# Patient Record
Sex: MALE | Race: WHITE | URBAN - NONMETROPOLITAN AREA
[De-identification: names, ages, dates, MRNs, and addresses within clinical notes are randomized per-mention and may not be internally consistent; named-entity substitution may affect disease eponyms.]

---

## 2021-06-23 LAB
CREATININE, RANDOM URINE: 44.2
CREATININE, URINE: 44.2
MICROALBUMIN/CREAT 24H UR: 7.1 MG/G{CREAT}
MICROALBUMIN/CREAT UR-RTO: 160.6

## 2021-08-14 LAB
ALBUMIN SERPL-MCNC: 4.5 G/DL
ALP BLD-CCNC: 47 U/L
ALT SERPL-CCNC: 31 U/L
ANION GAP SERPL CALCULATED.3IONS-SCNC: 11 MMOL/L
AST SERPL-CCNC: 26 U/L
BASOPHILS ABSOLUTE: NORMAL
BASOPHILS RELATIVE PERCENT: NORMAL
BILIRUB SERPL-MCNC: 0.5 MG/DL (ref 0.1–1.4)
BILIRUBIN, URINE: NEGATIVE
BLOOD, URINE: NEGATIVE
BUN BLDV-MCNC: 22 MG/DL
CALCIUM SERPL-MCNC: 8.8 MG/DL
CHLORIDE BLD-SCNC: 103 MMOL/L
CHOLESTEROL, TOTAL: 115 MG/DL
CHOLESTEROL/HDL RATIO: NORMAL
CLARITY: CLEAR
CO2: 31 MMOL/L
COLOR: YELLOW
CREAT SERPL-MCNC: 1.2 MG/DL
CREATININE, URINE: 64.6
EOSINOPHILS ABSOLUTE: NORMAL
EOSINOPHILS RELATIVE PERCENT: NORMAL
GFR CALCULATED: 77
GLUCOSE BLD-MCNC: 87 MG/DL
GLUCOSE URINE: NEGATIVE
HCT VFR BLD CALC: 47.2 % (ref 41–53)
HDLC SERPL-MCNC: 60 MG/DL (ref 35–70)
HEMOGLOBIN: 15.3 G/DL (ref 13.5–17.5)
KETONES, URINE: NEGATIVE
LDL CHOLESTEROL CALCULATED: 46 MG/DL (ref 0–160)
LEUKOCYTE ESTERASE, URINE: NEGATIVE
LYMPHOCYTES ABSOLUTE: NORMAL
LYMPHOCYTES RELATIVE PERCENT: NORMAL
MCH RBC QN AUTO: NORMAL PG
MCHC RBC AUTO-ENTMCNC: NORMAL G/DL
MCV RBC AUTO: NORMAL FL
MICROALBUMIN/CREAT 24H UR: 39.7 MG/G{CREAT}
MICROALBUMIN/CREAT UR-RTO: 61.5
MONOCYTES ABSOLUTE: NORMAL
MONOCYTES RELATIVE PERCENT: NORMAL
NEUTROPHILS ABSOLUTE: NORMAL
NEUTROPHILS RELATIVE PERCENT: NORMAL
NITRITE, URINE: NEGATIVE
NONHDLC SERPL-MCNC: NORMAL MG/DL
PH UA: 7 (ref 4.5–8)
PLATELET # BLD: 247 K/ΜL
PMV BLD AUTO: NORMAL FL
POTASSIUM SERPL-SCNC: 4 MMOL/L
PROTEIN UA: NEGATIVE
RBC # BLD: 5.49 10^6/ΜL
SODIUM BLD-SCNC: 141 MMOL/L
SPECIFIC GRAVITY, URINE: 1.01
TOTAL PROTEIN: 7.6
TRIGL SERPL-MCNC: 43 MG/DL
UROBILINOGEN, URINE: NORMAL
VLDLC SERPL CALC-MCNC: 9 MG/DL
WBC # BLD: 3.83 10^3/ML

## 2021-10-11 ENCOUNTER — OFFICE VISIT (OUTPATIENT)
Dept: NEPHROLOGY | Age: 26
End: 2021-10-11

## 2021-10-11 VITALS
OXYGEN SATURATION: 99 % | TEMPERATURE: 97.9 F | DIASTOLIC BLOOD PRESSURE: 70 MMHG | WEIGHT: 204.8 LBS | SYSTOLIC BLOOD PRESSURE: 119 MMHG | HEART RATE: 77 BPM

## 2021-10-11 DIAGNOSIS — R80.9 MICROALBUMINURIA: Primary | ICD-10-CM

## 2021-10-11 PROCEDURE — 99203 OFFICE O/P NEW LOW 30 MIN: CPT | Performed by: INTERNAL MEDICINE

## 2021-10-11 NOTE — PROGRESS NOTES
[address] [facility]  [address]  Dept: [phone]  Loc: [phone]  Outpatient Consult  [phone]  10/11/2021 8:29 AM EDT        Pt Name:    Smiley Lange  YOB: 1995  Primary Care Physician:  PAUL Pereira - CNP     Chief Complaint:   Chief Complaint   Patient presents with    New Patient        Background Information/Interval History:   The patient is a pleasant 27y.o. year old male referred by Eveline Cortes CNP for microalbuminuria. Patient had labs done for life insurance, I do not have those results but he states he repeated it 3 times and it was improved. Lab's done through PCP showed Urine MA was 61 mg/g. Serum creatinine 1.2.  UA negative for blood or protein. He is overall very healthy. He has not been diagnosed with any medical problems. He does not take any medications. He does work out a lot and was doing protein supplements a few times a week. He does get a lot of meat in his diet. Denies any history of gross hematuria, frothy urine, edema, or high blood pressure. He denies any family history of kidney problems. He denies NSAID use. He works as a physical therapist.        Allergies:  Amoxicillin        Past Medical History:  No past medical history on file. Past Surgical History:  No past surgical history on file. Family History:  No family history on file.      Social History:  Social History     Socioeconomic History    Marital status: Unknown     Spouse name: Not on file    Number of children: Not on file    Years of education: Not on file    Highest education level: Not on file   Occupational History    Not on file   Tobacco Use    Smoking status: Never Smoker    Smokeless tobacco: Never Used   Substance and Sexual Activity    Alcohol use: Not on file    Drug use: Not on file    Sexual activity: Not on file   Other Topics Concern    Not on file   Social History Narrative    Not on file     Social Determinants of Health     Financial Resource Strain:     Difficulty of Paying Living Expenses:    Food Insecurity:     Worried About Running Out of Food in the Last Year:     [address] in the Last Year:    Transportation Needs:     Lack of Transportation (Medical):  Lack of Transportation (Non-Medical):    Physical Activity:     Days of Exercise per Week:     Minutes of Exercise per Session:    Stress:     Feeling of Stress :    Social Connections:     Frequency of Communication with Friends and Family:     Frequency of Social Gatherings with Friends and Family:     Attends [employer] Services:     Active Member of Clubs or Organizations:     Attends Club or Organization Meetings:     Marital Status:    Intimate Partner Violence:     Fear of Current or Ex-Partner:     Emotionally Abused:     Physically Abused:     Sexually Abused:         Review of Systems:  Constitutional: no fever or chills  Head: No headaches  Eyes: no blurry vision, no discharge  Ears: no ear pain or hearing changes  Nose: no runny nose or epistaxis  Respiratory: no shortness of breath or cough or sputum production  Cardiovascular: no chest pain, no edema  GI: no nausea, no vomiting or diarrhea  : denies any hematuria, no flank pain  Skin: no rash  Musculoskeletal: no joint pain, moves all ext  Neuro: no tremor, no slurred speech  Psychiatric: stable mood, no depression or insomnia     Home Medications:  Prior to Admission medications    Not on File        Physical Examination:  VITALS:  /70 (Site: Right Upper Arm, Position: Sitting, Cuff Size: Large Adult)   Pulse 77   Temp 97.9 °F (36.6 °C)   Wt 204 lb 12.8 oz (92.9 kg)   SpO2 99%   There is no height or weight on file to calculate BMI.   Wt Readings from Last 3 Encounters:   10/11/21 204 lb 12.8 oz (92.9 kg)     Constitutional and General Appearance: alert and cooperative with exam, appears comfortable, no distress  Eyes: no icteric sclera, no pallor conjunctiva, no discharge seen from either eye  Ears and Nose: normal external appearance of left and right ear and nose. No active drainage from nose. Oral: moist oral mucus membranes  Neck: No jugular venous distention, appears symmetric, good ROM  Lungs: Air entry B/L, no crackles or rales, no use of accessory muscles  Heart: regular rate, S1, S2  Extremities: no LE edema  GI: soft, non-tender, no guarding  Skin: no rash seen on exposed extremities  Musculo: moves all extremities  Neuro: no slurred speech, no facial drooping, no asterixis  Psychiatric: Awake, alert, Oriented     Laboratory & Diagnostics:  CBC:   Lab Results   Component Value Date    WBC 3.83 08/14/2021    HGB 15.3 08/14/2021    HCT 47.2 08/14/2021     08/14/2021     BMP:    Lab Results   Component Value Date     08/14/2021    K 4.0 08/14/2021     08/14/2021    CO2 31 08/14/2021    BUN 22 08/14/2021    CREATININE 1.2 08/14/2021    GLUCOSE 87 08/14/2021      Hepatic:   Lab Results   Component Value Date    AST 26 08/14/2021    ALT 31 08/14/2021    BILITOT 0.5 08/14/2021    ALKPHOS 47 08/14/2021     BNP: No results found for: BNP  Lipids:   Lab Results   Component Value Date    CHOL 115 08/14/2021    HDL 60 08/14/2021     INR: No results found for: INR  URINE: No results found for: NAUR, PROTUR  Lab Results   Component Value Date    NITRU Negative 08/14/2021    COLORU Yellow 08/14/2021    PHUR 7.0 08/14/2021    CLARITYU Clear 08/14/2021    LEUKOCYTESUR Negative 08/14/2021    UROBILINOGEN Normal 08/14/2021    BILIRUBINUR Negative 08/14/2021    BLOODU Negative 08/14/2021    GLUCOSEU negative 08/14/2021    KETUA Negative 08/14/2021      Microalbumen/Creatinine ratio:  No components found for: RUCREAT        Impression/Plan:   1. Microalbuminuria, mild. Possibly related to past protein supplements. We will repeat this as well as BMP and obtain renal US.   Discussed with patient if this is something that is worsening then we will obtain further work up and possibly renal biopsy but for now will just monitor as it is mild. Repeat bloodwork and urine test now and f/u in 4 months and will repeat again at that time. Thought process was discussed with the patient.   Thank you for the referral.  Please do not hesitate to contact me if you have any questions or concerns        Anne Castillo, DO  [facility]

## 2021-10-18 ENCOUNTER — TELEPHONE (OUTPATIENT)
Dept: NEPHROLOGY | Age: 26
End: 2021-10-18

## 2021-10-18 DIAGNOSIS — R80.9 MICROALBUMINURIA: ICD-10-CM

## 2021-10-18 NOTE — TELEPHONE ENCOUNTER
----- Message from Victor Manuel Monique DO sent at 10/18/2021  2:41 PM EDT -----  Renal US looks normal  ----- Message -----  From: Sonido Baer MA  Sent: 10/18/2021  10:17 AM EDT  To: Victor Manuel Monique DO

## 2022-02-14 ENCOUNTER — OFFICE VISIT (OUTPATIENT)
Dept: NEPHROLOGY | Age: 27
End: 2022-02-14
Payer: COMMERCIAL

## 2022-02-14 VITALS
SYSTOLIC BLOOD PRESSURE: 133 MMHG | OXYGEN SATURATION: 98 % | WEIGHT: 211 LBS | DIASTOLIC BLOOD PRESSURE: 87 MMHG | HEART RATE: 78 BPM

## 2022-02-14 DIAGNOSIS — R80.9 PROTEINURIA, UNSPECIFIED TYPE: Primary | ICD-10-CM

## 2022-02-14 PROCEDURE — G8427 DOCREV CUR MEDS BY ELIG CLIN: HCPCS | Performed by: INTERNAL MEDICINE

## 2022-02-14 PROCEDURE — 1036F TOBACCO NON-USER: CPT | Performed by: INTERNAL MEDICINE

## 2022-02-14 PROCEDURE — 99214 OFFICE O/P EST MOD 30 MIN: CPT | Performed by: INTERNAL MEDICINE

## 2022-02-14 PROCEDURE — G8421 BMI NOT CALCULATED: HCPCS | Performed by: INTERNAL MEDICINE

## 2022-02-14 PROCEDURE — G8484 FLU IMMUNIZE NO ADMIN: HCPCS | Performed by: INTERNAL MEDICINE

## 2022-02-14 RX ORDER — LISINOPRIL 5 MG/1
5 TABLET ORAL DAILY
Qty: 30 TABLET | Refills: 5 | Status: SHIPPED | OUTPATIENT
Start: 2022-02-14 | End: 2022-08-08

## 2022-02-14 NOTE — PROGRESS NOTES
[facility]  [address] [facility] [address]  Dept: [phone]  Loc: [phone]  Progress Note  2/14/2022 11:37 AM      Pt Name:    Reena Simon  YOB: 1995  Primary Care Physician:  Loyda Blackburn, PAUL - CNP     Chief Complaint:   Chief Complaint   Patient presents with    Follow-up        History of Present Illness: This is a follow-up visit for proteinuria. Discovered after he had labs for life insurance. Overall healthy, no past medical problems. Repeat urine MA is 130 mg/g. UA showed no hematuria. Renal US unremarkable. Denies fam hx of kidney problems. BP borderline high today. Pertinent items are noted in HPI. Past History:  No past medical history on file. No past surgical history on file. VITALS:  /87 (Site: Right Upper Arm, Position: Sitting, Cuff Size: Large Adult)   Pulse 78   Wt 211 lb (95.7 kg)   SpO2 98%   Wt Readings from Last 3 Encounters:   02/14/22 211 lb (95.7 kg)   10/11/21 204 lb 12.8 oz (92.9 kg)     There is no height or weight on file to calculate BMI. General Appearance: alert and cooperative with exam, appears comfortable, no distress  HEENT: EOMI, moist oral mucus membranes  Neck: No jugular venous distention,  Lungs: Air entry B/L, no crackles or rales, no use of accessory muscles  Heart: S1, S2 heard, no rub  GI: soft, non-tender, no guarding, no flank pain  Extremities: No sig LE edema, no cyanosis  Skin: warm, dry  Neurologic: no tremor, no asterixis, no focal neurologic deficits     Medications:  Current Outpatient Medications   Medication Sig Dispense Refill    lisinopril (PRINIVIL;ZESTRIL) 5 MG tablet Take 1 tablet by mouth daily 30 tablet 5     No current facility-administered medications for this visit.         Laboratory & Diagnostics:  CBC:   Lab Results   Component Value Date    WBC 3.83 08/14/2021    HGB 15.3 08/14/2021 HCT 47.2 08/14/2021     08/14/2021     BMP:    Lab Results   Component Value Date     08/14/2021    K 4.0 08/14/2021     08/14/2021    CO2 31 08/14/2021    BUN 22 08/14/2021    CREATININE 1.2 08/14/2021    GLUCOSE 87 08/14/2021      Hepatic:   Lab Results   Component Value Date    AST 26 08/14/2021    ALT 31 08/14/2021    BILITOT 0.5 08/14/2021    ALKPHOS 47 08/14/2021     BNP: No results found for: BNP  Lipids:   Lab Results   Component Value Date    CHOL 115 08/14/2021    HDL 60 08/14/2021     INR: No results found for: INR  URINE: No results found for: Suha Camacho  Lab Results   Component Value Date    NITRU Negative 08/14/2021    COLORU Yellow 08/14/2021    PHUR 7.0 08/14/2021    CLARITYU Clear 08/14/2021    LEUKOCYTESUR Negative 08/14/2021    UROBILINOGEN Normal 08/14/2021    BILIRUBINUR Negative 08/14/2021    BLOODU Negative 08/14/2021    GLUCOSEU negative 08/14/2021    KETUA Negative 08/14/2021      Microalbumen/Creatinine ratio:  No components found for: RUCREAT        Impression/Plan:   1. Microalbuminuria: unclear etiology. No microhematuria. Start lisinopril 5 mg daily. Send serologies next visit. If worsens will do kidney biopsy. 2. BP:on higher side today, monitor at home        [facility] and medications were reviewed and plan of care discussed with the patient. Return to clinic in 6 months  or sooner if the need arises.       Nika Jaimes, DO  [facility]

## 2022-03-07 ENCOUNTER — TELEPHONE (OUTPATIENT)
Dept: NEPHROLOGY | Age: 27
End: 2022-03-07

## 2022-03-07 NOTE — TELEPHONE ENCOUNTER
----- Message from Junnie Hashimoto, DO sent at 3/6/2022  3:03 PM EST -----  Labs stable with lisinopril  ----- Message -----  From: Leif Pleitez  Sent: 3/4/2022   4:03 PM EST  To: Junnie Hashimoto, DO

## 2022-08-05 LAB
BUN BLDV-MCNC: 17 MG/DL
CALCIUM SERPL-MCNC: 8.9 MG/DL
CHLORIDE BLD-SCNC: 101 MMOL/L
CO2: 26 MMOL/L
CREAT SERPL-MCNC: 1.2 MG/DL
CREATININE, URINE: 93
GFR CALCULATED: >60
GLUCOSE BLD-MCNC: 86 MG/DL
MICROALBUMIN/CREAT 24H UR: 134.8 MG/G{CREAT}
MICROALBUMIN/CREAT UR-RTO: 145
POTASSIUM SERPL-SCNC: 3.9 MMOL/L
SODIUM BLD-SCNC: 136 MMOL/L

## 2022-08-08 ENCOUNTER — OFFICE VISIT (OUTPATIENT)
Dept: NEPHROLOGY | Age: 27
End: 2022-08-08
Payer: COMMERCIAL

## 2022-08-08 VITALS
WEIGHT: 205 LBS | SYSTOLIC BLOOD PRESSURE: 127 MMHG | OXYGEN SATURATION: 98 % | DIASTOLIC BLOOD PRESSURE: 65 MMHG | HEART RATE: 68 BPM

## 2022-08-08 DIAGNOSIS — R80.1 PERSISTENT PROTEINURIA: Primary | ICD-10-CM

## 2022-08-08 PROCEDURE — 1036F TOBACCO NON-USER: CPT | Performed by: INTERNAL MEDICINE

## 2022-08-08 PROCEDURE — G8421 BMI NOT CALCULATED: HCPCS | Performed by: INTERNAL MEDICINE

## 2022-08-08 PROCEDURE — 99214 OFFICE O/P EST MOD 30 MIN: CPT | Performed by: INTERNAL MEDICINE

## 2022-08-08 PROCEDURE — G8427 DOCREV CUR MEDS BY ELIG CLIN: HCPCS | Performed by: INTERNAL MEDICINE

## 2022-08-08 NOTE — PROGRESS NOTES
[address] [facility]  [address] [facility] [address]  Dept: [phone]  Loc: [phone]  Progress Note  8/8/2022 1:32 PM      Pt Name:    Smiley Lange  YOB: 1995  Primary Care Physician:  PAUL Pereira - CNP     Chief Complaint:   Chief Complaint   Patient presents with    Follow-up        History of Present Illness: This is a follow-up visit for proteinuria. Discovered after he had labs for life insurance. Overall healthy, no past medical problems. Started on lisinopril last visit. No improvement in proteinuria. Urine MA is 145 mg/g. C3 is low. Had an episode of gross hematuria last week which resolved. Pertinent items are noted in HPI. Past History:  No past medical history on file. No past surgical history on file. VITALS:  /65 (Site: Left Upper Arm, Position: Sitting, Cuff Size: Large Adult)   Pulse 68   Wt 205 lb (93 kg)   SpO2 98%   Wt Readings from Last 3 Encounters:   08/08/22 205 lb (93 kg)   02/14/22 211 lb (95.7 kg)   10/11/21 204 lb 12.8 oz (92.9 kg)     There is no height or weight on file to calculate BMI. General Appearance: alert and cooperative with exam, appears comfortable, no distress  HEENT: EOMI, moist oral mucus membranes  Neck: No jugular venous distention,  Lungs: Air entry B/L, no crackles or rales, no use of accessory muscles  Heart: S1, S2 heard, no rub  GI: soft, non-tender, no guarding, no flank pain  Extremities: No sig LE edema, no cyanosis  Skin: warm, dry  Neurologic: no tremor, no asterixis,      Medications:  Current Outpatient Medications   Medication Sig Dispense Refill    lisinopril (PRINIVIL;ZESTRIL) 5 MG tablet Take 1 tablet by mouth daily 30 tablet 5     No current facility-administered medications for this visit.         Laboratory & Diagnostics:  CBC:   Lab Results   Component Value Date    WBC 3.83 08/14/2021    HGB 15.3 08/14/2021    HCT 47.2 08/14/2021     08/14/2021     BMP:    Lab Results   Component Value Date     08/05/2022     08/14/2021    K 3.9 08/05/2022    K 4.0 08/14/2021     08/05/2022     08/14/2021    CO2 26 08/05/2022    CO2 31 08/14/2021    BUN 17 08/05/2022    BUN 22 08/14/2021    CREATININE 1.2 08/05/2022    CREATININE 1.2 08/14/2021    GLUCOSE 86 08/05/2022    GLUCOSE 87 08/14/2021      Hepatic:   Lab Results   Component Value Date    AST 26 08/14/2021    ALT 31 08/14/2021    BILITOT 0.5 08/14/2021    ALKPHOS 47 08/14/2021     BNP: No results found for: BNP  Lipids:   Lab Results   Component Value Date    CHOL 115 08/14/2021    HDL 60 08/14/2021     INR: No results found for: INR  URINE: No results found for: NAUR, PROTUR  Lab Results   Component Value Date/Time    NITRU Negative 08/14/2021 12:00 AM    COLORU Yellow 08/14/2021 12:00 AM    PHUR 7.0 08/14/2021 12:00 AM    CLARITYU Clear 08/14/2021 12:00 AM    LEUKOCYTESUR Negative 08/14/2021 12:00 AM    UROBILINOGEN Normal 08/14/2021 12:00 AM    BILIRUBINUR Negative 08/14/2021 12:00 AM    BLOODU Negative 08/14/2021 12:00 AM    GLUCOSEU negative 08/14/2021 12:00 AM    KETUA Negative 08/14/2021 12:00 AM      Microalbumen/Creatinine ratio:  No components found for: RUCREAT        Impression/Plan:   1. Microalbuminuria: unclear etiology. Started on lisinopril last visit. No improvement. C3 is low, IVA is pending. The proteinuria is very mild however he has no risk factors to have proteinuria. Since C3 is low we will go ahead and obtain renal biopsy          Bloodwork and medications were reviewed and plan of care discussed with the patient. Return to clinic in 1 month  or sooner if the need arises.       Mendoza Owusu DO  [facility]

## 2022-08-16 DIAGNOSIS — R80.1 PERSISTENT PROTEINURIA: ICD-10-CM

## 2022-08-25 ENCOUNTER — TELEPHONE (OUTPATIENT)
Dept: NEPHROLOGY | Age: 27
End: 2022-08-25

## 2022-08-25 DIAGNOSIS — N18.32 STAGE 3B CHRONIC KIDNEY DISEASE (HCC): Primary | ICD-10-CM

## 2022-08-25 NOTE — TELEPHONE ENCOUNTER
Jhony Vinson said he had a missed called from our office yesterday. I do not see anything in the chart. He thought maybe it was in regaurds to his biopsy.

## 2022-08-25 NOTE — TELEPHONE ENCOUNTER
Lashon Mcbride called again. He said he is sorry he is at work when you call him. He only works till [employer]. He works till 6 pm Monday and 4 pm Tuesday.

## 2023-09-05 DIAGNOSIS — N18.31 STAGE 3A CHRONIC KIDNEY DISEASE (HCC): Primary | ICD-10-CM

## 2023-09-11 ENCOUNTER — OFFICE VISIT (OUTPATIENT)
Dept: NEPHROLOGY | Age: 28
End: 2023-09-11
Payer: COMMERCIAL

## 2023-09-11 VITALS
HEART RATE: 68 BPM | SYSTOLIC BLOOD PRESSURE: 135 MMHG | DIASTOLIC BLOOD PRESSURE: 63 MMHG | WEIGHT: 205 LBS | OXYGEN SATURATION: 98 %

## 2023-09-11 DIAGNOSIS — N18.2 CKD (CHRONIC KIDNEY DISEASE), STAGE II: Primary | ICD-10-CM

## 2023-09-11 PROCEDURE — G8427 DOCREV CUR MEDS BY ELIG CLIN: HCPCS | Performed by: INTERNAL MEDICINE

## 2023-09-11 PROCEDURE — 1036F TOBACCO NON-USER: CPT | Performed by: INTERNAL MEDICINE

## 2023-09-11 PROCEDURE — 99213 OFFICE O/P EST LOW 20 MIN: CPT | Performed by: INTERNAL MEDICINE

## 2023-09-11 PROCEDURE — G8421 BMI NOT CALCULATED: HCPCS | Performed by: INTERNAL MEDICINE

## 2023-09-11 RX ORDER — LISINOPRIL 5 MG/1
5 TABLET ORAL DAILY
Qty: 90 TABLET | Refills: 3 | Status: SHIPPED | OUTPATIENT
Start: 2023-09-11 | End: 2023-12-10

## 2023-09-11 NOTE — PROGRESS NOTES
(PRINIVIL;ZESTRIL) 5 MG tablet Take 1 tablet by mouth daily 90 tablet 3     No current facility-administered medications for this visit. Laboratory & Diagnostics:  CBC:   Lab Results   Component Value Date    WBC 3.83 08/14/2021    HGB 15.3 08/14/2021    HCT 47.2 08/14/2021     08/14/2021     BMP:    Lab Results   Component Value Date     08/05/2022     08/14/2021    K 3.9 08/05/2022    K 4.0 08/14/2021     08/05/2022     08/14/2021    CO2 26 08/05/2022    CO2 31 08/14/2021    BUN 17 08/05/2022    BUN 22 08/14/2021    CREATININE 1.2 08/05/2022    CREATININE 1.2 08/14/2021    GLUCOSE 86 08/05/2022    GLUCOSE 87 08/14/2021      Hepatic:   Lab Results   Component Value Date    AST 26 08/14/2021    ALT 31 08/14/2021    BILITOT 0.5 08/14/2021    ALKPHOS 47 08/14/2021     BNP: No results found for: \"BNP\"  Lipids:   Lab Results   Component Value Date    CHOL 115 08/14/2021    HDL 60 08/14/2021     INR: No results found for: \"INR\"  URINE: No results found for: \"NAUR\", \"PROTUR\"  Lab Results   Component Value Date/Time    NITRU Negative 08/14/2021 12:00 AM    COLORU Yellow 08/14/2021 12:00 AM    PHUR 7.0 08/14/2021 12:00 AM    CLARITYU Clear 08/14/2021 12:00 AM    LEUKOCYTESUR Negative 08/14/2021 12:00 AM    UROBILINOGEN Normal 08/14/2021 12:00 AM    BILIRUBINUR Negative 08/14/2021 12:00 AM    BLOODU Negative 08/14/2021 12:00 AM    GLUCOSEU negative 08/14/2021 12:00 AM    KETUA Negative 08/14/2021 12:00 AM      Microalbumen/Creatinine ratio:  No components found for: \"RUCREAT\"         Media Information      Document Information    Lab:  Pathology Result   08/16/2022 renal biopsy   08/16/2022 00:00   Attached To:   CT BIOPSY RENAL [[id number]]   Orders Only on 8/23/22 with Sasha Pinto DO     Source Information    Kay Rosa LPN  [facility]     Impression/Plan:   1. CKD II, past post-infectious GN  2. Proteinuria, slightly worse. He stopped taking lisinopril.  We

## 2024-03-29 LAB
BUN BLDV-MCNC: 20 MG/DL
CALCIUM SERPL-MCNC: 8.8 MG/DL
CHLORIDE BLD-SCNC: 104 MMOL/L
CO2: 32 MMOL/L
CREAT SERPL-MCNC: 1 MG/DL
CREATININE, URINE: 171.2
EGFR: 93
GLUCOSE BLD-MCNC: 100 MG/DL
MICROALBUMIN/CREAT 24H UR: 130.6 MG/G{CREAT}
MICROALBUMIN/CREAT UR-RTO: 76.3
POTASSIUM SERPL-SCNC: 4.2 MMOL/L
SODIUM BLD-SCNC: 139 MMOL/L

## 2024-04-01 ENCOUNTER — TELEPHONE (OUTPATIENT)
Dept: NEPHROLOGY | Age: 29
End: 2024-04-01

## 2024-09-03 DIAGNOSIS — N18.2 CKD (CHRONIC KIDNEY DISEASE), STAGE II: Primary | ICD-10-CM

## 2024-09-09 ENCOUNTER — OFFICE VISIT (OUTPATIENT)
Dept: NEPHROLOGY | Age: 29
End: 2024-09-09
Payer: COMMERCIAL

## 2024-09-09 VITALS
WEIGHT: 202 LBS | DIASTOLIC BLOOD PRESSURE: 67 MMHG | HEART RATE: 82 BPM | OXYGEN SATURATION: 98 % | SYSTOLIC BLOOD PRESSURE: 127 MMHG

## 2024-09-09 DIAGNOSIS — N18.2 CKD (CHRONIC KIDNEY DISEASE), STAGE II: Primary | ICD-10-CM

## 2024-09-09 LAB
BUN BLDV-MCNC: 16 MG/DL
CALCIUM SERPL-MCNC: 9 MG/DL
CHLORIDE BLD-SCNC: 104 MMOL/L
CO2: 30 MMOL/L
CREAT SERPL-MCNC: 1.1 MG/DL
CREATININE URINE: 68 MG/DL
EGFR: 83
GLUCOSE BLD-MCNC: 114 MG/DL
MICROALBUMIN/CREAT 24H UR: 57.5 MG/DL
MICROALBUMIN/CREAT UR-RTO: 84.6 MG/G
POTASSIUM SERPL-SCNC: 4.2 MMOL/L
SODIUM BLD-SCNC: 140 MMOL/L

## 2024-09-09 PROCEDURE — 99214 OFFICE O/P EST MOD 30 MIN: CPT | Performed by: INTERNAL MEDICINE

## 2024-09-09 RX ORDER — LISINOPRIL 5 MG/1
5 TABLET ORAL DAILY
COMMUNITY
End: 2024-09-09 | Stop reason: SDUPTHER

## 2024-09-09 RX ORDER — LISINOPRIL 5 MG/1
5 TABLET ORAL DAILY
Qty: 30 TABLET | Refills: 3 | Status: SHIPPED | OUTPATIENT
Start: 2024-09-09 | End: 2024-09-09 | Stop reason: ALTCHOICE

## 2024-09-09 RX ORDER — LISINOPRIL 5 MG/1
5 TABLET ORAL DAILY
Qty: 90 TABLET | Refills: 3 | Status: SHIPPED | OUTPATIENT
Start: 2024-09-09 | End: 2024-12-08